# Patient Record
Sex: FEMALE | Race: WHITE | Employment: FULL TIME | URBAN - METROPOLITAN AREA
[De-identification: names, ages, dates, MRNs, and addresses within clinical notes are randomized per-mention and may not be internally consistent; named-entity substitution may affect disease eponyms.]

---

## 2023-10-18 ENCOUNTER — TELEPHONE (OUTPATIENT)
Dept: BEHAVIORAL HEALTH | Facility: CLINIC | Age: 43
End: 2023-10-18

## 2023-10-18 NOTE — TELEPHONE ENCOUNTER
Pt is a(n) adult (20+ out of HS) Seeking as eval for Adult Mental Health DA for evaluation and recommendations..  Appointment scheduled by:  Patient.  (self-pay - complete Cost Estimate)  Caller name: Mikayla (Goes [employer] [facility]ARR[facility])   Caller phone #: [phone]  Legal Guardianship Reviewed?  No  Honoring Choices Notified?  No  Brief reason for appt:  Therapist recommended Out patient PHP     needed?  NO    Contact information verified/updated: Yes    Yaritza Ace